# Patient Record
Sex: FEMALE | Race: WHITE | NOT HISPANIC OR LATINO | Employment: OTHER | URBAN - METROPOLITAN AREA
[De-identification: names, ages, dates, MRNs, and addresses within clinical notes are randomized per-mention and may not be internally consistent; named-entity substitution may affect disease eponyms.]

---

## 2019-11-02 ENCOUNTER — OFFICE VISIT (OUTPATIENT)
Dept: URGENT CARE | Facility: URGENT CARE | Age: 79
End: 2019-11-02
Payer: MEDICARE

## 2019-11-02 VITALS
DIASTOLIC BLOOD PRESSURE: 60 MMHG | OXYGEN SATURATION: 95 % | HEART RATE: 63 BPM | SYSTOLIC BLOOD PRESSURE: 128 MMHG | WEIGHT: 158 LBS | TEMPERATURE: 97.7 F | RESPIRATION RATE: 22 BRPM

## 2019-11-02 DIAGNOSIS — L01.00 IMPETIGO: Primary | ICD-10-CM

## 2019-11-02 DIAGNOSIS — B37.31 YEAST INFECTION OF THE VAGINA: ICD-10-CM

## 2019-11-02 DIAGNOSIS — B00.1 COLD SORE: ICD-10-CM

## 2019-11-02 PROCEDURE — 99203 OFFICE O/P NEW LOW 30 MIN: CPT | Performed by: PHYSICIAN ASSISTANT

## 2019-11-02 RX ORDER — LORATADINE 10 MG/1
10 TABLET ORAL
COMMUNITY
Start: 2017-04-19

## 2019-11-02 RX ORDER — METOPROLOL TARTRATE 25 MG/1
TABLET, FILM COATED ORAL
COMMUNITY
Start: 2018-09-28

## 2019-11-02 RX ORDER — ACYCLOVIR 400 MG/1
400 TABLET ORAL EVERY 8 HOURS
Qty: 15 TABLET | Refills: 0 | Status: SHIPPED | OUTPATIENT
Start: 2019-11-02 | End: 2022-05-05

## 2019-11-02 RX ORDER — FUROSEMIDE 20 MG
20 TABLET ORAL
COMMUNITY
Start: 2017-04-19

## 2019-11-02 RX ORDER — ALPRAZOLAM 0.5 MG
TABLET ORAL
COMMUNITY
Start: 2017-04-19

## 2019-11-02 RX ORDER — ROSUVASTATIN CALCIUM 40 MG/1
TABLET, COATED ORAL
COMMUNITY
Start: 2018-12-27

## 2019-11-02 RX ORDER — LOPERAMIDE HCL 2 MG
2 CAPSULE ORAL
COMMUNITY
Start: 2011-04-18

## 2019-11-02 RX ORDER — FLUCONAZOLE 150 MG/1
150 TABLET ORAL
Qty: 2 TABLET | Refills: 0 | Status: SHIPPED | OUTPATIENT
Start: 2019-11-02

## 2019-11-02 RX ORDER — GABAPENTIN 300 MG/1
300 CAPSULE ORAL
COMMUNITY
Start: 2018-12-26 | End: 2019-11-02

## 2019-11-02 RX ORDER — CEPHALEXIN 500 MG/1
500 CAPSULE ORAL 3 TIMES DAILY
Qty: 30 CAPSULE | Refills: 0 | Status: SHIPPED | OUTPATIENT
Start: 2019-11-02 | End: 2019-11-12

## 2019-11-02 RX ORDER — CITALOPRAM HYDROBROMIDE 20 MG/1
20 TABLET ORAL
COMMUNITY
Start: 2017-04-19

## 2019-11-02 RX ORDER — LEVOTHYROXINE SODIUM 50 UG/1
TABLET ORAL
COMMUNITY
Start: 2018-08-24

## 2019-11-02 NOTE — PROGRESS NOTES
SUBJECTIVE:  Maria Antonia Hancock is a 79 year old female who presents to the clinic today cold sore on the right side of the mouth that now has some yellow crusting and seems to be spreading. No fevers, ST or nasal congestion.  Does have a mild cough but no SOB or chest pain .     Associated symptoms include: denies sx of  fever, throat tightness, wheezing, tongue/lip swelling, shortness of breath, joint pain, nausea, vomiting, rhinorrhea, sore throat and URI symptoms.    PMH  Anxiety, YUDELKA,     Current Outpatient Medications   Medication Sig Dispense Refill     acyclovir (ZOVIRAX) 400 MG tablet Take 1 tablet (400 mg) by mouth every 8 hours for 5 days 15 tablet 0     ALPRAZolam (XANAX) 0.5 MG tablet   See Instructions, Instructions: 0.5-1 tab(s) PO daily prn anxiety with travel, PRN: for anxiety, # 10 tab(s), 0 Refill(s), Type: Maintenance, called to pharmacy (Rx)       apixaban ANTICOAGULANT (ELIQUIS ANTICOAGULANT) 5 MG tablet TK 1 T PO BID       citalopram (CELEXA) 20 MG tablet Take 20 mg by mouth       conjugated estrogens (PREMARIN) 0.625 MG/GM vaginal cream        fluconazole (DIFLUCAN) 150 MG tablet Take 1 tablet (150 mg) by mouth every 3 days 2 tablet 0     furosemide (LASIX) 20 MG tablet Take 20 mg by mouth       levothyroxine (SYNTHROID/LEVOTHROID) 50 MCG tablet TK 1 T PO D OES       loperamide (IMODIUM) 2 MG capsule Take 2 mg by mouth       loratadine (CLARITIN) 10 MG tablet Take 10 mg by mouth       metoprolol tartrate (LOPRESSOR) 25 MG tablet Take one tablet as needed when in atrial fibrillation with heart rate greater than 100       omeprazole (PRILOSEC) 20 MG DR capsule Take 20 mg by mouth       rosuvastatin (CRESTOR) 40 MG tablet TK 1 T PO D       Social History     Tobacco Use     Smoking status: Never Smoker     Smokeless tobacco: Never Used   Substance Use Topics     Alcohol use: Not on file       ROS:  Review of systems negative except as stated above.    EXAM:   /60 (BP Location: Right arm)    Pulse 63   Temp 97.7  F (36.5  C) (Tympanic)   Resp 22   Wt 71.7 kg (158 lb)   SpO2 95%   GENERAL: alert, no acute distress.  SKIN: right side of upper lip with vesicles consistent with cold sores but has surrounding yellow honey crusting that appears to be impetigo   GENERAL APPEARANCE: healthy, alert and no distress  EYES: EOMI,  PERRL, conjunctiva clear  HENT: ear canals and TM's normal.  Nose and mouth without ulcers, erythema or lesions  NECK: supple, non-tender to palpation, no adenopathy noted  RESP: lungs clear to auscultation - no rales, rhonchi or wheezes  CV: regular rates and rhythm, normal S1 S2, no murmur noted    assessment/plan:  (L01.00) Impetigo  (primary encounter diagnosis)  Comment:   Plan: cephALEXin (KEFLEX) 500 MG capsule        Med as directed and hygiene discussed.      (B00.1) Cold sore  Comment:   Plan: acyclovir (ZOVIRAX) 400 MG tablet, fluconazole         (DIFLUCAN) 150 MG tablet        Med as directed and nature of cold sores reviewed.      (B37.3) Yeast infection of the vagina  Comment:   Plan: no current sx but states that gets with antibiotic used and med if needed.  Follow-up with PCP if sx fail to improve as anticipated

## 2019-11-07 ENCOUNTER — HEALTH MAINTENANCE LETTER (OUTPATIENT)
Age: 79
End: 2019-11-07

## 2020-02-17 ENCOUNTER — HEALTH MAINTENANCE LETTER (OUTPATIENT)
Age: 80
End: 2020-02-17

## 2020-11-29 ENCOUNTER — HEALTH MAINTENANCE LETTER (OUTPATIENT)
Age: 80
End: 2020-11-29

## 2021-04-10 ENCOUNTER — HEALTH MAINTENANCE LETTER (OUTPATIENT)
Age: 81
End: 2021-04-10

## 2021-05-25 ENCOUNTER — RECORDS - HEALTHEAST (OUTPATIENT)
Dept: ADMINISTRATIVE | Facility: CLINIC | Age: 81
End: 2021-05-25

## 2021-05-26 ENCOUNTER — RECORDS - HEALTHEAST (OUTPATIENT)
Dept: ADMINISTRATIVE | Facility: CLINIC | Age: 81
End: 2021-05-26

## 2021-05-27 ENCOUNTER — RECORDS - HEALTHEAST (OUTPATIENT)
Dept: ADMINISTRATIVE | Facility: CLINIC | Age: 81
End: 2021-05-27

## 2021-05-28 ENCOUNTER — RECORDS - HEALTHEAST (OUTPATIENT)
Dept: ADMINISTRATIVE | Facility: CLINIC | Age: 81
End: 2021-05-28

## 2021-05-29 ENCOUNTER — RECORDS - HEALTHEAST (OUTPATIENT)
Dept: ADMINISTRATIVE | Facility: CLINIC | Age: 81
End: 2021-05-29

## 2021-05-30 ENCOUNTER — RECORDS - HEALTHEAST (OUTPATIENT)
Dept: ADMINISTRATIVE | Facility: CLINIC | Age: 81
End: 2021-05-30

## 2021-07-13 ENCOUNTER — RECORDS - HEALTHEAST (OUTPATIENT)
Dept: ADMINISTRATIVE | Facility: CLINIC | Age: 81
End: 2021-07-13

## 2021-07-21 ENCOUNTER — RECORDS - HEALTHEAST (OUTPATIENT)
Dept: ADMINISTRATIVE | Facility: CLINIC | Age: 81
End: 2021-07-21

## 2021-09-25 ENCOUNTER — HEALTH MAINTENANCE LETTER (OUTPATIENT)
Age: 81
End: 2021-09-25

## 2022-05-01 ENCOUNTER — HEALTH MAINTENANCE LETTER (OUTPATIENT)
Age: 82
End: 2022-05-01

## 2022-05-05 ENCOUNTER — VIRTUAL VISIT (OUTPATIENT)
Dept: FAMILY MEDICINE | Facility: CLINIC | Age: 82
End: 2022-05-05
Payer: MEDICARE

## 2022-05-05 VITALS — TEMPERATURE: 100.1 F

## 2022-05-05 DIAGNOSIS — U07.1 INFECTION DUE TO 2019 NOVEL CORONAVIRUS: Primary | ICD-10-CM

## 2022-05-05 PROCEDURE — 99204 OFFICE O/P NEW MOD 45 MIN: CPT | Mod: 95 | Performed by: NURSE PRACTITIONER

## 2022-05-05 RX ORDER — MIRABEGRON 25 MG/1
25 TABLET, FILM COATED, EXTENDED RELEASE ORAL DAILY
COMMUNITY

## 2022-05-05 RX ORDER — FLECAINIDE ACETATE 50 MG/1
50 TABLET ORAL 2 TIMES DAILY
COMMUNITY
Start: 2022-02-24

## 2022-05-05 ASSESSMENT — PAIN SCALES - GENERAL: PAINLEVEL: NO PAIN (0)

## 2022-05-05 NOTE — PROGRESS NOTES
5/5/2022  5:30 pm    Patient was contacted and has appointment for treatment tomorrow 5/6.    Angela Lassiter RN

## 2022-05-05 NOTE — PROGRESS NOTES
Maria Antonia is a 81 year old who is being evaluated via a billable video visit.      How would you like to obtain your AVS? MyChart  If the video visit is dropped, the invitation should be resent by: Text to cell phone: [phone]  Will anyone else be joining your video visit? No    Video Start Time: 9:39 AM    Assessment & Plan     Infection due to 2019 novel coronavirus  I agree with her plan to pursue monoclonal antibody treatment.  MNRAP completed during visit with pt.          Reviewed chart for 5 minutes     COVID-19 positive patient.  Encounter for consideration of medication intervention. Patient does qualify for a prescription. Full discussion with patient including medication options, risks and benefits. Potential drug interactions reviewed with patient.     Treatment Planned Referral to MNRAP for possible monoclonal antibodies.   Patient aware that he/she may still qualify for molnupiravir if not chosen for treatment, and still less than 5 days from symptom onset.  If so, will need to contact us ASAP.    Temporary change to home medications:  None     There is no height or weight on file to calculate BMI.  No results found for: GFRESTIMATED  No results found for: [id number]      No follow-ups on file.    MITCH Grijalva Ra CNP  [facility]    Silvio Em is a 81 year old who presents for the following health issues     HPI       COVID-19 Symptom Review  How many days ago did these symptoms start?yesterday morning. Fever, chills and cough, Sore throat. Tested positive for Covid.    Are any of the following symptoms significant for you?    New or worsening difficulty breathing? No    Worsening cough? Yes, it's a dry cough.     Fever or chills? Yes, the highest temperature was 100.2, yesterday. Today, currently is 100.1    Headache: no    Sore throat: YES    Chest pain: no    Diarrhea: YES    Body aches? no    What treatments has patient tried? Tylenol on Eliquis   Does  patient live in a nursing home, group home, or shelter? no  Does patient have a way to get food/medications during quarantined? Yes, I have a friend or family member who can help me.      Taking in food and fluids.  Breathing without problem.  [facility].  Has been having difficulty navigating the system to get monoclonal antibody treatment.  She has a hx of a fib, taking eliquis and flecainide.    She cannot take paxlovid due to interactions.  She was prescribed molnupiravir but is concerned about the decreased effectiveness.      Review of Systems   Constitutional, HEENT, cardiovascular, pulmonary, gi and gu systems are negative, except as otherwise noted.      Objective           Vitals:  No vitals were obtained today due to virtual visit.    Physical Exam   GENERAL: Healthy, alert and no distress  EYES: Eyes grossly normal to inspection.  No discharge or erythema, or obvious scleral/conjunctival abnormalities.  RESP: No audible wheeze, cough, or visible cyanosis.  No visible retractions or increased work of breathing.    SKIN: Visible skin clear. No significant rash, abnormal pigmentation or lesions.  NEURO: Cranial nerves grossly intact.  Mentation and speech appropriate for age.  PSYCH: Mentation appears normal, affect normal/bright, judgement and insight intact, normal speech and appearance well-groomed.                Video-Visit Details    Type of service:  Video Visit    Video End Time:9:54 AM    Originating Location (pt. Location): Home    Distant Location (provider location):  [facility]     Platform used for Video Visit: Magnetic

## 2022-05-05 NOTE — Clinical Note
Please call at the end of the day to see if she has received a call from the state about monoclonal antibody treatment.

## 2022-09-23 ENCOUNTER — APPOINTMENT (OUTPATIENT)
Dept: RADIOLOGY | Facility: CLINIC | Age: 82
End: 2022-09-23
Attending: EMERGENCY MEDICINE
Payer: MEDICARE

## 2022-09-23 ENCOUNTER — APPOINTMENT (OUTPATIENT)
Dept: CT IMAGING | Facility: CLINIC | Age: 82
End: 2022-09-23
Attending: EMERGENCY MEDICINE
Payer: MEDICARE

## 2022-09-23 ENCOUNTER — OFFICE VISIT (OUTPATIENT)
Dept: URGENT CARE | Facility: URGENT CARE | Age: 82
End: 2022-09-23
Payer: MEDICARE

## 2022-09-23 ENCOUNTER — HOSPITAL ENCOUNTER (EMERGENCY)
Facility: CLINIC | Age: 82
Discharge: HOME | End: 2022-09-23
Attending: EMERGENCY MEDICINE | Admitting: EMERGENCY MEDICINE
Payer: MEDICARE

## 2022-09-23 VITALS
HEART RATE: 62 BPM | SYSTOLIC BLOOD PRESSURE: 137 MMHG | TEMPERATURE: 97.4 F | WEIGHT: 160 LBS | BODY MASS INDEX: 29.44 KG/M2 | HEIGHT: 62 IN | OXYGEN SATURATION: 96 % | RESPIRATION RATE: 20 BRPM | DIASTOLIC BLOOD PRESSURE: 71 MMHG

## 2022-09-23 VITALS
TEMPERATURE: 98.8 F | WEIGHT: 167 LBS | HEART RATE: 64 BPM | DIASTOLIC BLOOD PRESSURE: 60 MMHG | OXYGEN SATURATION: 97 % | SYSTOLIC BLOOD PRESSURE: 118 MMHG

## 2022-09-23 DIAGNOSIS — M25.511 ACUTE PAIN OF RIGHT SHOULDER: ICD-10-CM

## 2022-09-23 DIAGNOSIS — S80.212A ABRASION OF LEFT KNEE, INITIAL ENCOUNTER: ICD-10-CM
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
## 2022-09-23 DIAGNOSIS — S09.90XA TRAUMATIC INJURY OF HEAD, INITIAL ENCOUNTER: Primary | ICD-10-CM

## 2022-09-23 PROCEDURE — 73030 X-RAY EXAM OF SHOULDER: CPT | Mod: RT

## 2022-09-23 PROCEDURE — 70450 CT HEAD/BRAIN W/O DYE: CPT | Mod: MA

## 2022-09-23 PROCEDURE — 99285 EMERGENCY DEPT VISIT HI MDM: CPT | Mod: 25

## 2022-09-23 PROCEDURE — 72125 CT NECK SPINE W/O DYE: CPT | Mod: MA

## 2022-09-23 PROCEDURE — 99213 OFFICE O/P EST LOW 20 MIN: CPT | Performed by: PHYSICIAN ASSISTANT

## 2022-09-23 PROCEDURE — 250N000013 HC RX MED GY IP 250 OP 250 PS 637: Performed by: EMERGENCY MEDICINE

## 2022-09-23 PROCEDURE — 250N000011 HC RX IP 250 OP 636: Performed by: EMERGENCY MEDICINE

## 2022-09-23 RX ORDER — ACETAMINOPHEN 325 MG/1
650 TABLET ORAL ONCE
Status: COMPLETED | OUTPATIENT
Start: 2022-09-23 | End: 2022-09-23

## 2022-09-23 RX ORDER — ONDANSETRON 4 MG/1
4 TABLET, ORALLY DISINTEGRATING ORAL ONCE
Status: COMPLETED | OUTPATIENT
Start: 2022-09-23 | End: 2022-09-23

## 2022-09-23 RX ORDER — LIDOCAINE 40 MG/G
CREAM TOPICAL
Status: DISCONTINUED | OUTPATIENT
Start: 2022-09-23 | End: 2022-09-23

## 2022-09-23 RX ORDER — NEOMYCIN/BACITRACIN/POLYMYXINB 3.5-400-5K
OINTMENT (GRAM) TOPICAL 4 TIMES DAILY
Status: DISCONTINUED | OUTPATIENT
Start: 2022-09-23 | End: 2022-09-23 | Stop reason: HOSPADM

## 2022-09-23 RX ADMIN — ACETAMINOPHEN 650 MG: 325 TABLET ORAL at 17:19

## 2022-09-23 RX ADMIN — ONDANSETRON 4 MG: 4 TABLET, ORALLY DISINTEGRATING ORAL at 17:19

## 2022-09-23 RX ADMIN — BACITRACIN ZINC, NEOMYCIN, POLYMYXIN B: 400; 3.5; 5 OINTMENT TOPICAL at 17:20

## 2022-09-23 ASSESSMENT — ACTIVITIES OF DAILY LIVING (ADL): ADLS_ACUITY_SCORE: 35

## 2022-09-23 ASSESSMENT — PAIN SCALES - GENERAL: PAINLEVEL: MODERATE PAIN (5)

## 2022-09-23 NOTE — PROGRESS NOTES
Assessment & Plan     1. Traumatic injury of head, initial encounter  Patient with head trauma 2 days ago, now with headache. Patient is anticoagulated on Eliquis.   Advised ER for further evaluation        ANGELINA Orellana [facility] [place]    CHIEF COMPLAINT:   Chief Complaint   Patient presents with     Head Injury     Patient hit head  on concrete 3 days ago- patient achy all over and patient has a headache - patient is on blood thinners   Legs, neck, shoulder knee- sore      Subjective     Maria Antonia is a 82 year old female who presents to clinic today for evaluation.  2 days ago, patient was walking out of a restaurant, tripped and fell on her right shoulder and hit the back of her head on the concrete.  She did not have loss of consciousness.  She has had 0 episodes of vomiting.  Now having a headache.  Patient is on blood thinners.  Denies having numbness or tingling into her extremities.  No neck pain.  Denies dizziness.      No past medical history on file.  No past surgical history on file.  Social History     Tobacco Use     Smoking status: Never Smoker     Smokeless tobacco: Never Used   Substance Use Topics     Alcohol use: Not on file     Current Outpatient Medications   Medication     apixaban ANTICOAGULANT (ELIQUIS) 5 MG tablet     citalopram (CELEXA) 20 MG tablet     flecainide (TAMBOCOR) 50 MG tablet     Fluticasone-Umeclidin-Vilanterol (TRELEGY ELLIPTA) 200-62.5-25 MCG/INH oral inhaler     furosemide (LASIX) 20 MG tablet     levothyroxine (SYNTHROID/LEVOTHROID) 50 MCG tablet     metoprolol tartrate (LOPRESSOR) 25 MG tablet     mirabegron (MYRBETRIQ) 25 MG 24 hr tablet     omeprazole (PRILOSEC) 20 MG DR capsule     rosuvastatin (CRESTOR) 40 MG tablet     ALPRAZolam (XANAX) 0.5 MG tablet     conjugated estrogens (PREMARIN) 0.625 MG/GM vaginal cream     fluconazole (DIFLUCAN) 150 MG tablet     loperamide (IMODIUM) 2 MG capsule     loratadine (CLARITIN) 10 MG tablet     No  current facility-administered medications for this visit.     Allergies   Allergen Reactions     Levofloxacin Other (See Comments) and Palpitations     Comment: palpitations, Description: Levaquin, Comment: palpitations, Description: Levaquin       Aspirin Tinnitus     Ibandronic Acid      Monosodium Glutamate Diarrhea     Nitebite      Other reaction(s): *Unknown     Penicillins Diarrhea     Sulfa Drugs        10 point ROS of systems were all negative except for pertinent positives noted in my HPI.      Exam:   /60   Pulse 64   Temp 98.8  F (37.1  C) (Tympanic)   Wt 75.8 kg (167 lb)   SpO2 97%   Constitutional: healthy, alert and no distress  Head: Normocephalic, atraumatic.  Neck: neck is supple, no cervical lymphadenopathy or nuchal rigidity  Cardiovascular: RRR  Respiratory: CTA bilaterally, no rhonchi or rales  Gastrointestinal: soft and nontender  Skin: no rashes  Neurologic: Speech clear, gait normal. Moves all extremities.    No results found for any visits on 09/23/22.

## 2022-09-23 NOTE — ED TRIAGE NOTES
[facility], fell to her knees and hit her right shoulder, fell backwards and hit head.    On Eliquis for A fib.  Went to  today who suggested she come to the department to be evaluated.     Triage Assessment     Row Name 09/23/22 1162       Triage Assessment (Adult)    Airway WDL WDL       Respiratory WDL    Respiratory WDL WDL       Skin Circulation/Temperature WDL    Skin Circulation/Temperature WDL WDL       Cardiac WDL    Cardiac WDL WDL       Peripheral/Neurovascular WDL    Peripheral Neurovascular WDL WDL       Cognitive/Neuro/Behavioral WDL    Cognitive/Neuro/Behavioral WDL WDL

## 2022-09-23 NOTE — ED PROVIDER NOTES
EMERGENCY DEPARTMENT ENCOUNTER      NAME: Maria Antonia Hancock  AGE: 82 year old female  YOB: 1940  MRN: [id number]  EVALUATION DATE & TIME: 9/23/2022  4:09 PM    PCP: Stella Yost    ED PROVIDER: Maame Carmona M.D.      Chief Complaint   Patient presents with     Fall     On eliquis, fell on Wednesday         FINAL IMPRESSION:  1. Abrasion of left knee, initial encounter    2. Fall, initial encounter    3. Acute pain of right shoulder          ED COURSE & MEDICAL DECISION MAKING:    ED Course as of 09/23/22 1952   Fri Sep 23, 2022   1642 Trauma alert called with fall and head strike on coumadin with some mild nausea although neurovascularly intact GCS 15 and well appearing with fall on Wednesday, 2 days ago. Primary location of discomfort 7/10 is right posterior trapezus distribution diffusely without focal bony region pain which is very much reassuring and patient able to ROM right shoulder and touch left shoulder with right hand, bilateral radial pluses bounding, and on pains to any particular spot, given acetaminophen which she has not yet had and zofran with some nausea earlier today. Patient now stat to CT for CT head and c-spine although reassuringly no pain at that location on exam, as she has some pain to right shoulder which could distract and over age 65. Reassuringly no other midline back pains or bodily pains except slight bruising to both knees but no difficulty ambulating on legs or pain ot knees thus imaging there not indicated at this time, small abrasion left antieor knee scabbed and well appeairng. Wound care provided for left knee, tetanus UTD per patient.   1709 CT head and c-spine reassuringly negative for acute traumatic pathology   1710 XR reassuringly WNL and patient ambulatory   1710 Patient discharged after being provided with extensive anticipatory guidance and given return precautions, importance of PMD follow-up emphasized.        Pertinent Labs & Imaging studies reviewed. (See  chart for details)    N95 worn  A face shield was worn also  COVID PPE    4:34 PM I met with the patient, obtained history, performed an initial exam, and discussed options and plan for diagnostics and treatment here in the ED.   5:12 PM I rechecked and updated the patient. Discussed discharge. Patient is agreeable.    At the conclusion of the encounter I discussed the results of all of the tests and the disposition. The questions were answered. The patient or family acknowledged understanding and was agreeable with the care plan.     MEDICATIONS GIVEN IN THE EMERGENCY:  Medications   acetaminophen (TYLENOL) tablet 650 mg (650 mg Oral Given 9/23/22 1719)   ondansetron (ZOFRAN ODT) ODT tab 4 mg (4 mg Oral Given 9/23/22 1719)       NEW PRESCRIPTIONS STARTED AT TODAY'S ER VISIT  Discharge Medication List as of 9/23/2022  5:11 PM             =================================================================    HPI      Maria Antonia Hancock is a 82 year old female with PMHx of atrial fibrillation (on eliquis) who presents to the ED today via walk-in after a fall.    Patient reports noon on Wednesday (9/21), she was leaving a restaurant when she stepped on an uneven concrete corner, lost her balance and fell forward. She initially fell to her knees, then twisted onto herself. She spun too fast and fell back, hitting her head on the cement. Patient denies loss of consciousness. Each day following the incident, she has been getting progressively more achy. Today, her daughter told her to present to the ED. Patient denies vomiting. Today, she has been a little nauseous. Patient reports that she sustained some knee swelling and scrapes from the fall, along with pain to her left forearm. She endorses she has been able to walk. No abdominal pain. She reports 7/10 pain to the back of her right shoulder. Patient has not taken anything for pain. No other current complaints.      REVIEW OF SYSTEMS   All other systems reviewed and are  "negative except as noted above in HPI.    PAST MEDICAL HISTORY:  No past medical history on file.    PAST SURGICAL HISTORY:  No past surgical history on file.    CURRENT MEDICATIONS:    ALPRAZolam (XANAX) 0.5 MG tablet  apixaban ANTICOAGULANT (ELIQUIS) 5 MG tablet  citalopram (CELEXA) 20 MG tablet  conjugated estrogens (PREMARIN) 0.625 MG/GM vaginal cream  flecainide (TAMBOCOR) 50 MG tablet  fluconazole (DIFLUCAN) 150 MG tablet  Fluticasone-Umeclidin-Vilanterol (TRELEGY ELLIPTA) 200-62.5-25 MCG/INH oral inhaler  furosemide (LASIX) 20 MG tablet  levothyroxine (SYNTHROID/LEVOTHROID) 50 MCG tablet  loperamide (IMODIUM) 2 MG capsule  loratadine (CLARITIN) 10 MG tablet  metoprolol tartrate (LOPRESSOR) 25 MG tablet  mirabegron (MYRBETRIQ) 25 MG 24 hr tablet  omeprazole (PRILOSEC) 20 MG DR capsule  rosuvastatin (CRESTOR) 40 MG tablet        ALLERGIES:  Allergies   Allergen Reactions     Levofloxacin Other (See Comments) and Palpitations     Comment: palpitations, Description: Levaquin, Comment: palpitations, Description: Levaquin       Aspirin Tinnitus     Ibandronic Acid      Monosodium Glutamate Diarrhea     Nitebite      Other reaction(s): *Unknown     Penicillins Diarrhea     Sulfa Drugs        FAMILY HISTORY:  Family History   Problem Relation Age of Onset     Breast Cancer Sister        SOCIAL HISTORY:   Social History     Socioeconomic History     Marital status:    Tobacco Use     Smoking status: Never Smoker     Smokeless tobacco: Never Used       VITALS:  Patient Vitals for the past 24 hrs:   BP Temp Temp src Pulse Resp SpO2 Height Weight   09/23/22 1603 137/71 97.4  F (36.3  C) Temporal 62 20 96 % 1.575 m (5' 2\") 72.6 kg (160 lb)       PHYSICAL EXAM    VITAL SIGNS: /71   Pulse 62   Temp 97.4  F (36.3  C) (Temporal)   Resp 20   Ht 1.575 m (5' 2\")   Wt 72.6 kg (160 lb)   SpO2 96%   BMI 29.26 kg/m     GENERAL: Awake, alert.  In no acute distress. GCS 15  HEENT: Normocephalic, atraumatic.  Pupils " equal, round and reactive.  Conjunctiva normal.  EOMI. No cortés sign, no racoon eyes, no mastoid tenderness, no hemotympanum, no facial instability, no nasal bridge pain, no nasal septal hematoma, no intraoral lacerations, no loose teeth, no mandible pain or deformity  NECK: No stridor or apparent deformity. No midline pain to palpation.  PULMONARY: Symmetrical breath sounds without distress.  Lungs clear to auscultation bilaterally without wheezes, rhonchi or rales.  CARDIO: Regular rate and rhythm.  No significant murmur, rub or gallop.  Radial pulses strong and symmetrical.  THORAX: No focal chest wall deformity or crepitus  BACK: No focal tenderness or deformity to each vertebral level in midline  ABDOMINAL: Abdomen soft, non-distended and non-tender to palpation.  No CVAT, BL.  EXTREMITIES: Pelvis stable and without focal tenderness. Bilateral pedal pulses 2+ and equal. Bilateral knee medial aspect slight swelling. Left anterior knee superficial abrasion with scabbing. Right trapezius distribution tender. Right shoulder diffusely tender without limited range of motion. Left anterior forearm slightly tender without bruising or deformities  NEURO: Alert and oriented to person, place and time.  Cranial nerves grossly intact.  No focal motor deficit. Sensation globally intact.  PSYCH: Normal mood and affect  SKIN: No rashes        LAB:  All pertinent labs reviewed and interpreted.  Results for orders placed or performed during the hospital encounter of 09/23/22   CT Head w/o Contrast    Impression    IMPRESSION:  HEAD CT:  1.  No acute intracranial process.    CERVICAL SPINE CT:  1.  No CT evidence for acute fracture or post traumatic subluxation.   CT Cervical Spine w/o Contrast    Impression    IMPRESSION:  HEAD CT:  1.  No acute intracranial process.    CERVICAL SPINE CT:  1.  No CT evidence for acute fracture or post traumatic subluxation.   XR Shoulder Right 2 Views    Impression    IMPRESSION: No fracture or  dislocation. Mild degenerative changes at the AC joint.       RADIOLOGY:  Reviewed all pertinent imaging. Please see official radiology report.  XR Shoulder Right 2 Views   Final Result   IMPRESSION: No fracture or dislocation. Mild degenerative changes at the AC joint.      CT Cervical Spine w/o Contrast   Final Result   IMPRESSION:   HEAD CT:   1.  No acute intracranial process.      CERVICAL SPINE CT:   1.  No CT evidence for acute fracture or post traumatic subluxation.      CT Head w/o Contrast   Final Result   IMPRESSION:   HEAD CT:   1.  No acute intracranial process.      CERVICAL SPINE CT:   1.  No CT evidence for acute fracture or post traumatic subluxation.                  I, Emily Davis, am serving as a scribe to document services personally performed by Dr. Maame Carmona based on my observation and the provider's statements to me. I, Maame Carmona MD attest that Emily Davis is acting in a scribe capacity, has observed my performance of the services and has documented them in accordance with my direction.     Maame Carmona MD  09/23/22 1952

## 2022-09-23 NOTE — PROGRESS NOTES
Assessment & Plan     There are no diagnoses linked to this encounter.      No follow-ups on file.    Diagnosis and treatment plan was reviewed with patient and/or family.   We went over any labs or imaging. Discussed worsening symptoms or little to no relief despite treatment plan to follow-up with PCP or return to clinic.  Patient verbalizes understanding. All questions were addressed and answered.     Mary Carmen Figueredo PA-C  [facility] [place]    CHIEF COMPLAINT:   Chief Complaint   Patient presents with     Head Injury     Patient hit head  on concrete 3 days ago- patient achy all over and patient has a headache - patient is on blood thinners   Legs, neck, shoulder knee- sore      Subjective     Maria Antonia is a 82 year old female who presents to clinic today for evaluation ***      No past medical history on file.  No past surgical history on file.  Social History     Tobacco Use     Smoking status: Never Smoker     Smokeless tobacco: Never Used   Substance Use Topics     Alcohol use: Not on file     Current Outpatient Medications   Medication     apixaban ANTICOAGULANT (ELIQUIS) 5 MG tablet     citalopram (CELEXA) 20 MG tablet     flecainide (TAMBOCOR) 50 MG tablet     Fluticasone-Umeclidin-Vilanterol (TRELEGY ELLIPTA) 200-62.5-25 MCG/INH oral inhaler     furosemide (LASIX) 20 MG tablet     levothyroxine (SYNTHROID/LEVOTHROID) 50 MCG tablet     metoprolol tartrate (LOPRESSOR) 25 MG tablet     mirabegron (MYRBETRIQ) 25 MG 24 hr tablet     omeprazole (PRILOSEC) 20 MG DR capsule     ALPRAZolam (XANAX) 0.5 MG tablet     conjugated estrogens (PREMARIN) 0.625 MG/GM vaginal cream     fluconazole (DIFLUCAN) 150 MG tablet     loperamide (IMODIUM) 2 MG capsule     loratadine (CLARITIN) 10 MG tablet     rosuvastatin (CRESTOR) 40 MG tablet     No current facility-administered medications for this visit.     Allergies   Allergen Reactions     Levofloxacin Other (See Comments) and Palpitations     Comment:  palpitations, Description: Levaquin, Comment: palpitations, Description: Levaquin       Aspirin Tinnitus     Ibandronic Acid      Monosodium Glutamate Diarrhea     Nitebite      Other reaction(s): *Unknown     Penicillins Diarrhea     Sulfa Drugs        10 point ROS of systems were all negative except for pertinent positives noted in my HPI.      Exam: ***  /60   Pulse 64   Temp 98.8  F (37.1  C) (Tympanic)   Wt 75.8 kg (167 lb)   SpO2 97%   Constitutional: healthy, alert and no distress  Head: Normocephalic, atraumatic.  Eyes: conjunctiva clear, no drainage  ENT: TMs clear and shiny brittany, nasal mucosa pink and moist, throat without tonsillar hypertrophy or erythema  Neck: neck is supple, no cervical lymphadenopathy or nuchal rigidity  Cardiovascular: RRR  Respiratory: CTA bilaterally, no rhonchi or rales  Gastrointestinal: soft and nontender  Skin: no rashes  Neurologic: Speech clear, gait normal. Moves all extremities.    No results found for any visits on 09/23/22.

## 2022-12-26 ENCOUNTER — HEALTH MAINTENANCE LETTER (OUTPATIENT)
Age: 82
End: 2022-12-26

## 2023-06-02 ENCOUNTER — HEALTH MAINTENANCE LETTER (OUTPATIENT)
Age: 83
End: 2023-06-02

## 2024-06-23 ENCOUNTER — HEALTH MAINTENANCE LETTER (OUTPATIENT)
Age: 84
End: 2024-06-23

## 2025-07-12 ENCOUNTER — HEALTH MAINTENANCE LETTER (OUTPATIENT)
Age: 85
End: 2025-07-12